# Patient Record
Sex: FEMALE | Race: WHITE | HISPANIC OR LATINO | Employment: STUDENT | URBAN - METROPOLITAN AREA
[De-identification: names, ages, dates, MRNs, and addresses within clinical notes are randomized per-mention and may not be internally consistent; named-entity substitution may affect disease eponyms.]

---

## 2018-09-21 ENCOUNTER — TRANSFERRED RECORDS (OUTPATIENT)
Dept: HEALTH INFORMATION MANAGEMENT | Facility: CLINIC | Age: 28
End: 2018-09-21

## 2019-01-08 ENCOUNTER — OFFICE VISIT (OUTPATIENT)
Dept: FAMILY MEDICINE | Facility: CLINIC | Age: 29
End: 2019-01-08
Payer: COMMERCIAL

## 2019-01-08 VITALS
OXYGEN SATURATION: 100 % | RESPIRATION RATE: 16 BRPM | BODY MASS INDEX: 23.4 KG/M2 | HEART RATE: 80 BPM | DIASTOLIC BLOOD PRESSURE: 72 MMHG | WEIGHT: 140.6 LBS | SYSTOLIC BLOOD PRESSURE: 115 MMHG

## 2019-01-08 DIAGNOSIS — H53.419 VISUAL FIELD SCOTOMA, UNSPECIFIED LATERALITY: Primary | ICD-10-CM

## 2019-01-08 DIAGNOSIS — H53.9 VISUAL DISTURBANCE: ICD-10-CM

## 2019-01-08 LAB
ANION GAP SERPL CALCULATED.3IONS-SCNC: 6 MMOL/L (ref 3–14)
BUN SERPL-MCNC: 8 MG/DL (ref 7–30)
CALCIUM SERPL-MCNC: 8.7 MG/DL (ref 8.5–10.1)
CHLORIDE SERPL-SCNC: 106 MMOL/L (ref 94–109)
CO2 SERPL-SCNC: 26 MMOL/L (ref 20–32)
CREAT SERPL-MCNC: 0.66 MG/DL (ref 0.52–1.04)
GFR SERPL CREATININE-BSD FRML MDRD: >90 ML/MIN/{1.73_M2}
GLUCOSE SERPL-MCNC: 85 MG/DL (ref 70–99)
HGB BLD-MCNC: 13 G/DL (ref 11.7–15.7)
POTASSIUM SERPL-SCNC: 4.2 MMOL/L (ref 3.4–5.3)
SODIUM SERPL-SCNC: 138 MMOL/L (ref 133–144)
TSH SERPL DL<=0.005 MIU/L-ACNC: 1.18 MU/L (ref 0.4–4)

## 2019-01-08 ASSESSMENT — ENCOUNTER SYMPTOMS
NERVOUS/ANXIOUS: 0
LIGHT-HEADEDNESS: 1
FATIGUE: 1
DYSPHORIC MOOD: 0
DECREASED CONCENTRATION: 0
WEAKNESS: 0
PHOTOPHOBIA: 0
ACTIVITY CHANGE: 0
POLYPHAGIA: 0
POLYDIPSIA: 0
EYE ITCHING: 0
NUMBNESS: 0
RESPIRATORY NEGATIVE: 1
APPETITE CHANGE: 0
CARDIOVASCULAR NEGATIVE: 1
DIAPHORESIS: 0
DIZZINESS: 0
SEIZURES: 0
SLEEP DISTURBANCE: 0
EYE PAIN: 0
SINUS PAIN: 0
EYE DISCHARGE: 0
SINUS PRESSURE: 0

## 2019-01-08 NOTE — PROGRESS NOTES
"       HPI       Gail Oneil is a 28 year old  who presents for   Chief Complaint   Patient presents with     Eye Problem     vision disturbances       Visual disturbances x 8 months  - \"trailing blurs\" when she moves her head; sometimes \"sees an outline of the person after they go away\"  - \"flashing\" in upper visual field, typically to the L side, sometimes a \"block\" or \"blur\"  - no associated HA, no other weakness, numbness, tingling  - seems worse when dehydrated  - normal eating pattern, doesn't skip meals, hasn't lost weight  - no GI symptoms or other neuro symptoms    [facility]  - no change in glasses prescription  - told that her retinal exam was normal  - advised to have further eval of symptoms    No h/o migraine  +h/o mild anxiety (that has been fine, though initially a little more stressed at work)  Feels some plugging of her ears on occasion, but then that clears up without incident    Works on the [employer] as an RN, 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        Current Outpatient Medications   Medication Sig Dispense Refill     etonogestrel-ethinyl estradiol (NUVARING) 0.12-0.015 MG/24HR vaginal ring Place 1 each vaginally every 28 days       Fexofenadine HCl (ALLEGRA PO) Take 180 mg by mouth       fluticasone (FLONASE) 50 MCG/ACT nasal spray Spray 2 sprays into both nostrils daily         No Known Allergies.    Patient is a new patient to this clinic and so  I reviewed/updated the Past Medical History, the Family History and the Social History   Past Medical History:   Diagnosis Date     NO ACTIVE PROBLEMS (aka NONE)      Family History   Problem Relation Age of Onset     Glaucoma Maternal Grandfather      Diabetes Paternal " "Grandmother      Ovarian Cancer Paternal Grandmother      Social History     Socioeconomic History     Marital status: Single     Spouse name: None     Number of children: None     Years of education: None     Highest education level: None   Social Needs     Financial resource strain: None     Food insecurity - worry: None     Food insecurity - inability: None     Transportation needs - medical: None     Transportation needs - non-medical: None   Occupational History     None   Tobacco Use     Smoking status: Never Smoker     Smokeless tobacco: Never Used   Substance and Sexual Activity     Alcohol use: Yes     Drug use: No     Sexual activity: Yes     Partners: Male     Birth control/protection: Inserts/Ring   Other Topics Concern     Parent/sibling w/ CABG, MI or angioplasty before 65F 55M? Not Asked   Social History Narrative     None   .         Review of Systems:   Review of Systems   Constitutional: Positive for fatigue (mild). Negative for activity change, appetite change and diaphoresis.   HENT: Negative for congestion, hearing loss, sinus pressure, sinus pain and tinnitus.    Eyes: Positive for visual disturbance (see HPI). Negative for photophobia, pain, discharge and itching.   Respiratory: Negative.    Cardiovascular: Negative.    Endocrine: Negative for polydipsia, polyphagia and polyuria.   Neurological: Positive for light-headedness (on rare occasion feels like she has a \"vasovagal attack\"). Negative for dizziness, seizures, weakness and numbness.   Psychiatric/Behavioral: Negative for decreased concentration, dysphoric mood and sleep disturbance. The patient is not nervous/anxious.             Physical Exam:     Vitals:    01/08/19 1451   BP: 115/72   Pulse: 80   Resp: 16   SpO2: 100%   Weight: 63.8 kg (140 lb 9.6 oz)     Body mass index is 23.4 kg/m .  Vitals were reviewed and were normal     Physical Exam   Constitutional: She is oriented to person, place, and time. She appears well-developed and " well-nourished.   HENT:   Head: Normocephalic and atraumatic.   Eyes: Conjunctivae and EOM are normal. Pupils are equal, round, and reactive to light. Right eye exhibits no discharge. Left eye exhibits no discharge. No scleral icterus.   Pulmonary/Chest: Effort normal.   Musculoskeletal: Normal range of motion.   Neurological: She is alert and oriented to person, place, and time. She displays normal reflexes. No cranial nerve deficit or sensory deficit. She exhibits normal muscle tone. Coordination normal.     MENTAL STATUS EXAM  Appearance: appropriate  Attitude: cooperative  Behavior: normal  Eye Contact: normal  Speech: normal  Orientation: oriented to person, place, time and situation  Mood: normal  Affect: Congruent with mood  Thought Process: appropriate      Results:   Results are ordered and pending    Assessment and Plan        Patient Instructions   Here is the plan from today's visit    1. Visual field scotoma, unspecified laterality  2. Visual disturbance  Reassured re: normal neuro exam, no evidence of weakness or deficit  No headaches  Possibly indicates ocular migraine or aura without headache?  Some basic labs checked today  Recommend maintaining adequate hydration, rest, and po intake (as all of these could make things worse)  Referral for neuro-optho to review case and recommend if any further eval is warranted (or trial of medication)  - Hemoglobin  - Basic Metabolic Panel ([facility])  - TSH with free T4 reflex  - OPHTHALMOLOGY ADULT REFERRAL    Please call or return to clinic if your symptoms don't go away.    Follow up plan - after labs and eye appt           There are no discontinued medications.    Options for treatment and follow-up care were reviewed with the patient. Gail Oneil  engaged in the decision making process and verbalized understanding of the options discussed and agreed with the final plan.    Kanwal Roldan MD

## 2019-01-08 NOTE — PATIENT INSTRUCTIONS
Here is the plan from today's visit    1. Visual field scotoma, unspecified laterality  - Hemoglobin  - Basic Metabolic Panel ([facility])  - TSH with free T4 reflex  - OPHTHALMOLOGY ADULT REFERRAL    2. Visual disturbance  - OPHTHALMOLOGY ADULT REFERRAL    Please call or return to clinic if your symptoms don't go away.    Follow up plan - after labs and eye appt    Thank you for coming to [facility] today.  Lab Testing:  **If you had lab testing today and your results are reassuring or normal they will be mailed to you or sent through [facility] within 7 days.   **If the lab tests need quick action we will call you with the results.  The phone number we will call with results is # [phone] (home) . If this is not the best number please call our clinic and change the number.  Medication Refills:  If you need any refills please call your pharmacy and they will contact us.   If you need to  your refill at a new pharmacy, please contact the new pharmacy directly. The new pharmacy will help you get your medications transferred faster.   Scheduling:  If you have any concerns about today's visit or wish to schedule another appointment please call our office during normal business hours [phone] (8-5:00 M-F)  If a referral was made to a [facility] and you don't get a call from central scheduling please call [phone].  If a Mammogram was ordered for you at [facility] call [phone] to schedule or change your appointment.  If you had an XRay/CT/Ultrasound/MRI ordered the number is [phone] to schedule or change your radiology appointment.   Medical Concerns:  If you have urgent medical concerns please call [phone] at any time of the day.  If you have a medical emergency please call [phone].

## 2019-01-24 NOTE — TELEPHONE ENCOUNTER
FUTURE VISIT INFORMATION      FUTURE VISIT INFORMATION:    Date: 02/20/19    Time: 900am    Location: [facility]  REFERRAL INFORMATION:    Referring provider:  DOROTHY BUCHANAN    Referring providers clinic:  [facility]    Reason for visit/diagnosis  Visual field scotoma, unspecified laterality, Visual disturbance    RECORDS REQUESTED FROM:       Clinic name Comments Records Status Imaging Status   [facility] Notes in Epic per visit on 1/8/19 IN EPIC

## 2019-01-30 ENCOUNTER — PRE VISIT (OUTPATIENT)
Dept: OPHTHALMOLOGY | Facility: CLINIC | Age: 29
End: 2019-01-30

## 2019-01-30 DIAGNOSIS — H53.10 SUBJECTIVE VISUAL DISTURBANCE: Primary | ICD-10-CM

## 2019-02-15 ENCOUNTER — HEALTH MAINTENANCE LETTER (OUTPATIENT)
Age: 29
End: 2019-02-15

## 2019-02-20 ENCOUNTER — OFFICE VISIT (OUTPATIENT)
Dept: OPHTHALMOLOGY | Facility: CLINIC | Age: 29
End: 2019-02-20
Payer: COMMERCIAL

## 2019-02-20 DIAGNOSIS — G43.109 OCULAR MIGRAINE: Primary | ICD-10-CM

## 2019-02-20 DIAGNOSIS — H53.10 SUBJECTIVE VISUAL DISTURBANCE: ICD-10-CM

## 2019-02-20 DIAGNOSIS — H53.419 VISUAL FIELD SCOTOMA, UNSPECIFIED LATERALITY: ICD-10-CM

## 2019-02-20 ASSESSMENT — EXTERNAL EXAM - LEFT EYE: OS_EXAM: NORMAL

## 2019-02-20 ASSESSMENT — TONOMETRY
IOP_METHOD: ICARE
OS_IOP_MMHG: 19
OD_IOP_MMHG: 21

## 2019-02-20 ASSESSMENT — SLIT LAMP EXAM - LIDS
COMMENTS: NORMAL
COMMENTS: NORMAL

## 2019-02-20 ASSESSMENT — CONF VISUAL FIELD
METHOD: COUNTING FINGERS
OD_NORMAL: 1
OS_NORMAL: 1

## 2019-02-20 ASSESSMENT — EXTERNAL EXAM - RIGHT EYE: OD_EXAM: NORMAL

## 2019-02-20 ASSESSMENT — CUP TO DISC RATIO
OD_RATIO: 0.3
OS_RATIO: 0.3

## 2019-02-20 ASSESSMENT — VISUAL ACUITY
OS_CC: 20/20
CORRECTION_TYPE: GLASSES
METHOD: SNELLEN - LINEAR
OD_CC: 20/20

## 2019-02-20 NOTE — LETTER
2019         RE:  :  MRN: Gail Oneil  1990  [id number]     Dear Dr. Roldan,    Thank you for asking me to see your very pleasant patient, Gail Oneil, in neuro-ophthalmic consultation.  I would like to thank you for sending your records and I have summarized them in the history of present illness.  My assessment and plan are below.  For further details, please see my attached clinic note.           Assessment & Plan     Gail Oneil is a 28 year old female with the following diagnoses:   1. Ocular migraine    2. Subjective visual disturbance       Referred by her PCP for visual symptoms.     She notices TV static x 6 months. She notices trailing objects x 6-8 months. She still sees an shadow after she looks at something. She also feels like there's a rectangular/oval wavy water (this happens about once a week, lasts for about 1 hour). No associated headaches. All her visual symptoms worsen when she is more tired. She also feels that dehydration makes it better as well.    No history of migraine headaches. She does not get headaches at all.     Summer of 2018 she did use LSD 6 times. 3-4 years ago, she did use it 4 years. She rarely uses marijuana.     She did have an eye exam  which was normal.     Past med history: none  Past ocular history: none   Meds: nuvaring, allergra, flonase.  Family history: negative for migraine headaches.     On examination, her visual acuity is 20/20 both eyes. There was no afferent pupillary defect. Color plates full both eyes. Anterior segment and funduscopic examination were unremarkable. retinal nerve fiber layer OCT normal. GTOP visual field normal both eyes.     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  Her palinopsias are most likely related to her previous use of LSD.      She also has persistent positive visual phenomena or visual snow. This is an entity where patients see constant or  nearly constant unformed hallucinations.  The most common description that patients give is television snow, however it has been described as red dots, pixelations, and black spots.  Given the normal visual field, no further workup is necessary.  I reassured the patient that this is a common phenomenon and is not vision threatening.  I also told the patient that learning to ignore it is the best strategy and that there is no treatment for it.        She does not endorse any history of migraine headaches or have a family history of migraine headaches. But her rectangular/waviness sound like ocular auras. Her eye examination is normal today.  Reassurance provided.      Again, thank you for allowing me to participate in the care of your patient.      Sincerely,    James Mann MD  Professor  Ophthalmology Residency   Director of Neuro-Ophthalmology  [employer] [facility] [employer] Endowed Chair  Departments of Ophthalmology, Neurology, and Neurosurgery  [facility] [address]  [address] [place] [address]  [id number]  T - [phone]  F - [phone]  [email or web address]    DX = persistent positive visual phenomena of migraine, palinopsia

## 2019-02-20 NOTE — PROGRESS NOTES
Assessment & Plan     Gail Oneil is a 28 year old female with the following diagnoses:   1. Ocular migraine    2. Subjective visual disturbance       Referred by her PCP for visual symptoms.     She notices TV static x 6 months. She notices trailing objects x 6-8 months. She still sees an shadow after she looks at something. She also feels like there's a rectangular/oval wavy water (this happens about once a week, lasts for about 1 hour). No associated headaches. All her visual symptoms worsen when she is more tired. She also feels that dehydration makes it better as well.    No history of migraine headaches. She does not get headaches at all.     Summer of 2018 she did use LSD 6 times. 3-4 years ago, she did use it 4 years. She rarely uses marijuana.     She did have an eye exam fall of 2018 which was normal.     Past med history: none  Past ocular history: none   Meds: nuvaring, allergra, flonase.  Family history: negative for migraine headaches.     On examination, her visual acuity is 20/20 both eyes. There was no afferent pupillary defect. Color plates full both eyes. Anterior segment and funduscopic examination were unremarkable. retinal nerve fiber layer OCT normal. GTOP visual field normal both eyes.     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  Her palinopsias are most likely related to her previous use of LSD.      She also has persistent positive visual phenomena or visual snow. This is an entity where patients see constant or nearly constant unformed hallucinations.  The most common description that patients give is television snow, however it has been described as red dots, pixelations, and black spots.  Given the normal visual field, no further workup is necessary.  I reassured the patient that this is a common phenomenon and is not vision threatening.  I also told the patient that learning to ignore it is the best strategy and that there  is no treatment for it.        She does not endorse any history of migraine headaches or have a family history of migraine headaches. But her rectangular/waviness sound like ocular auras. Her eye examination is normal today.  Reassurance provided.             Attending Physician Attestation:  Complete documentation of historical and exam elements from today's encounter can be found in the full encounter summary report (not reduplicated in this progress note).  I personally obtained the chief complaint(s) and history of present illness.  I confirmed and edited as necessary the review of systems, past medical/surgical history, family history, social history, and examination findings as documented by others; and I examined the patient myself.  I personally reviewed the relevant tests, images, and reports as documented above.  I formulated and edited as necessary the assessment and plan and discussed the findings and management plan with the patient and family. - James Forbes MD  Neuro-ophthalmology Fellow

## 2019-02-20 NOTE — NURSING NOTE
Chief Complaints and History of Present Illnesses   Patient presents with     Visual Field Defect Evaluation     Chief Complaint(s) and History of Present Illness(es)     Visual Field Defect Evaluation     Laterality: both eyes    Quality: fluctuating    Associated symptoms: Negative for headache    Treatments tried: no treatments    Pain scale: 0/10              Comments     Pt states has had some visual snow- started 6 months ago. Pt states it looks really distracting but does not blur her vision. Feels like there is a patch of vision in the left eye the is always wavy. No head aches/ migraines.     Last eye exam 4 months ago.    Ric Vann COT 9:13 AM February 20, 2019

## 2020-03-02 ENCOUNTER — HEALTH MAINTENANCE LETTER (OUTPATIENT)
Age: 30
End: 2020-03-02

## 2020-08-06 ENCOUNTER — RESULTS ONLY (OUTPATIENT)
Dept: LAB | Age: 30
End: 2020-08-06

## 2020-08-06 ENCOUNTER — OFFICE VISIT (OUTPATIENT)
Dept: URGENT CARE | Facility: URGENT CARE | Age: 30
End: 2020-08-06
Payer: COMMERCIAL

## 2020-08-06 DIAGNOSIS — Z53.9 ERRONEOUS ENCOUNTER--DISREGARD: Primary | ICD-10-CM

## 2020-08-07 LAB
SARS-COV-2 RNA SPEC QL NAA+PROBE: NOT DETECTED
SPECIMEN SOURCE: NORMAL

## 2020-12-20 ENCOUNTER — HEALTH MAINTENANCE LETTER (OUTPATIENT)
Age: 30
End: 2020-12-20

## 2021-04-24 ENCOUNTER — HEALTH MAINTENANCE LETTER (OUTPATIENT)
Age: 31
End: 2021-04-24

## 2021-10-03 ENCOUNTER — HEALTH MAINTENANCE LETTER (OUTPATIENT)
Age: 31
End: 2021-10-03

## 2021-12-24 NOTE — PROGRESS NOTES
SUBJECTIVE:   CC: Gail Oneil is an 31 year old woman who presents for preventive health visit.     Patient has been advised of split billing requirements and indicates understanding: Yes  Healthy Habits:     Getting at least 3 servings of Calcium per day:  Yes    Bi-annual eye exam:  Yes    Dental care twice a year:  Yes    Sleep apnea or symptoms of sleep apnea:  None    Diet:  Regular (no restrictions)    Frequency of exercise:  1 day/week    Duration of exercise:  15-30 minutes    Taking medications regularly:  Yes    Medication side effects:  None    PHQ-2 Total Score: 2    Additional concerns today:  No    She has a history significant for depression, anxiety and seasonal allergies.  She feels like these issues are under good control on her current medications.  She reports feeling like she is in good health.    Social history: , no children.  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                Today's PHQ-2 Score:   PHQ-2 ( 1999 [employer]) 12/28/2021   Q1: Little interest or pleasure in doing things 1   Q2: Feeling down, depressed or hopeless 1   PHQ-2 Score 2   PHQ-2 Total Score (12-17 Years)- Positive if 3 or more points; Administer PHQ-A if positive -   Q1: Little interest or pleasure in doing things Several days   Q2: Feeling down, depressed or hopeless Several days   PHQ-2 Score 2       Abuse: Current or Past (Physical, Sexual or Emotional) - No  Do you feel safe in your environment? Yes    Have you ever done Advance Care Planning? (For example, a Health Directive, POLST, or a discussion with a medical provider or your loved ones about your wishes): No, advance care planning information given to patient to review.  Patient declined advance care planning discussion at this time.    Social History     Tobacco Use     Smoking status: Never Smoker     Smokeless tobacco: Never Used   Substance Use Topics     Alcohol use: Yes     If you drink alcohol do you  typically have >3 drinks per day or >7 drinks per week? No    Alcohol Use 12/28/2021   Prescreen: >3 drinks/day or >7 drinks/week? Yes   Prescreen: >3 drinks/day or >7 drinks/week? -   AUDIT SCORE  8       Reviewed orders with patient.  Reviewed health maintenance and updated orders accordingly - Yes  Lab work is in process    Breast Cancer Screening:  Any new diagnosis of family breast, ovarian, or bowel cancer? No    FHS-7:   Breast CA Risk Assessment (FHS-7) 12/28/2021   Did any of your first-degree relatives have breast or ovarian cancer? No   Did any of your relatives have bilateral breast cancer? No   Did any man in your family have breast cancer? No   Did any woman in your family have breast and ovarian cancer? Yes   Did any woman in your family have breast cancer before age 50 y? No   Do you have 2 or more relatives with breast and/or ovarian cancer? No   Do you have 2 or more relatives with breast and/or bowel cancer? No         Pertinent mammograms are reviewed under the imaging tab.       Reviewed and updated as needed this visit by clinical staff  Tobacco  Allergies  Meds   Med Hx  Surg Hx  Fam Hx  Soc Hx       Reviewed and updated as needed this visit by Provider    Meds                Review of Systems   Constitutional: Negative for chills and fever.   HENT: Negative for congestion, ear pain, hearing loss and sore throat.    Eyes: Negative for pain and visual disturbance.   Respiratory: Negative for cough and shortness of breath.    Cardiovascular: Negative for chest pain, palpitations and peripheral edema.   Gastrointestinal: Negative for abdominal pain, constipation, diarrhea, heartburn, hematochezia and nausea.   Genitourinary: Negative for dysuria, frequency, genital sores, hematuria and urgency.   Musculoskeletal: Negative for arthralgias, joint swelling and myalgias.   Skin: Negative for rash.   Neurological: Negative for dizziness, weakness, headaches and paresthesias.    Psychiatric/Behavioral: Negative for mood changes. The patient is nervous/anxious.           OBJECTIVE:   /75   Pulse 59   Temp 98.5  F (36.9  C) (Oral)   Wt 66.6 kg (146 lb 12.8 oz)   SpO2 97%   BMI 24.43 kg/m    Physical Exam  GENERAL: healthy, alert and no distress  EYES: Eyes grossly normal to inspection, PERRL and conjunctivae and sclerae normal  HENT: ear canals and TM's normal, nose and mouth without ulcers or lesions  NECK: no adenopathy, no asymmetry, masses, or scars and thyroid normal to palpation  RESP: lungs clear to auscultation - no rales, rhonchi or wheezes  CV: regular rate and rhythm, normal S1 S2, no S3 or S4, no murmur, click or rub, no peripheral edema and peripheral pulses strong  ABDOMEN: soft, nontender, no hepatosplenomegaly, no masses and bowel sounds normal  MS: no gross musculoskeletal defects noted, no edema  SKIN: no suspicious lesions or rashes  NEURO: Normal strength and tone, mentation intact and speech normal  PSYCH: mentation appears normal, affect normal/bright    Diagnostic Test Results:  Labs reviewed in Epic    ASSESSMENT/PLAN:   1. Routine general medical examination at a health care facility  She appears to be in good health.  We are going to check fasting labs as listed below.    2. Moderate episode of recurrent major depressive disorder (H)  Stable and well-controlled on citalopram.  She may continue to see her psychiatrist.    3. Anxiety  Stable and controlled on citalopram.    4. Seasonal allergic rhinitis, unspecified trigger  Stable and controlled on Flonase and Allegra.    5. Screening cholesterol level  - Lipid Profile (Chol, Trig, HDL, LDL calc); Future  - Lipid Profile (Chol, Trig, HDL, LDL calc)    6. Screening for diabetes mellitus  - Glucose; Future  - Glucose    7. Encounter for hepatitis C screening test for low risk patient  - Hepatitis C antibody; Future  - Hepatitis C antibody      Patient has been advised of split billing requirements and  "indicates understanding: Yes  COUNSELING:  Reviewed preventive health counseling, as reflected in patient instructions       Regular exercise       Healthy diet/nutrition       Consider Hep C screening for all patients one time for ages 18-79 years    Estimated body mass index is 24.43 kg/m  as calculated from the following:    Height as of 12/8/15: 1.651 m (5' 5\").    Weight as of this encounter: 66.6 kg (146 lb 12.8 oz).        She reports that she has never smoked. She has never used smokeless tobacco.      Counseling Resources:  ATP IV Guidelines  Pooled Cohorts Equation Calculator  Breast Cancer Risk Calculator  BRCA-Related Cancer Risk Assessment: FHS-7 Tool  FRAX Risk Assessment  ICSI Preventive Guidelines  Dietary Guidelines for Americans, 2010  USDA's MyPlate  ASA Prophylaxis  Lung CA Screening    Joe Cardoza DO  [facility]  "

## 2021-12-28 ENCOUNTER — OFFICE VISIT (OUTPATIENT)
Dept: FAMILY MEDICINE | Facility: CLINIC | Age: 31
End: 2021-12-28
Payer: COMMERCIAL

## 2021-12-28 VITALS
OXYGEN SATURATION: 97 % | DIASTOLIC BLOOD PRESSURE: 75 MMHG | TEMPERATURE: 98.5 F | HEART RATE: 59 BPM | SYSTOLIC BLOOD PRESSURE: 105 MMHG | WEIGHT: 146.8 LBS | BODY MASS INDEX: 24.43 KG/M2

## 2021-12-28 DIAGNOSIS — F33.1 MODERATE EPISODE OF RECURRENT MAJOR DEPRESSIVE DISORDER (H): ICD-10-CM

## 2021-12-28 DIAGNOSIS — J30.2 SEASONAL ALLERGIC RHINITIS, UNSPECIFIED TRIGGER: ICD-10-CM

## 2021-12-28 DIAGNOSIS — F41.9 ANXIETY: ICD-10-CM

## 2021-12-28 DIAGNOSIS — Z11.59 ENCOUNTER FOR HEPATITIS C SCREENING TEST FOR LOW RISK PATIENT: ICD-10-CM

## 2021-12-28 DIAGNOSIS — Z13.1 SCREENING FOR DIABETES MELLITUS: ICD-10-CM

## 2021-12-28 DIAGNOSIS — Z00.00 ROUTINE GENERAL MEDICAL EXAMINATION AT A HEALTH CARE FACILITY: Primary | ICD-10-CM

## 2021-12-28 DIAGNOSIS — Z13.220 SCREENING CHOLESTEROL LEVEL: ICD-10-CM

## 2021-12-28 LAB
CHOLEST SERPL-MCNC: 186 MG/DL
FASTING STATUS PATIENT QL REPORTED: YES
FASTING STATUS PATIENT QL REPORTED: YES
GLUCOSE BLD-MCNC: 89 MG/DL (ref 70–99)
HCV AB SERPL QL IA: NONREACTIVE
HDLC SERPL-MCNC: 112 MG/DL
LDLC SERPL CALC-MCNC: 65 MG/DL
NONHDLC SERPL-MCNC: 74 MG/DL
TRIGL SERPL-MCNC: 47 MG/DL

## 2021-12-28 PROCEDURE — 36415 COLL VENOUS BLD VENIPUNCTURE: CPT | Performed by: FAMILY MEDICINE

## 2021-12-28 PROCEDURE — 82947 ASSAY GLUCOSE BLOOD QUANT: CPT | Performed by: FAMILY MEDICINE

## 2021-12-28 PROCEDURE — 99385 PREV VISIT NEW AGE 18-39: CPT | Performed by: FAMILY MEDICINE

## 2021-12-28 PROCEDURE — 80061 LIPID PANEL: CPT | Performed by: FAMILY MEDICINE

## 2021-12-28 PROCEDURE — 86803 HEPATITIS C AB TEST: CPT | Performed by: FAMILY MEDICINE

## 2021-12-28 RX ORDER — CITALOPRAM HYDROBROMIDE 20 MG/1
40 TABLET ORAL DAILY
COMMUNITY
Start: 2021-11-27 | End: 2023-06-09

## 2021-12-28 ASSESSMENT — ENCOUNTER SYMPTOMS
WEAKNESS: 0
CONSTIPATION: 0
MYALGIAS: 0
DIZZINESS: 0
ARTHRALGIAS: 0
JOINT SWELLING: 0
SORE THROAT: 0
NERVOUS/ANXIOUS: 1
PALPITATIONS: 0
ABDOMINAL PAIN: 0
DIARRHEA: 0
EYE PAIN: 0
FEVER: 0
DYSURIA: 0
HEARTBURN: 0
HEADACHES: 0
CHILLS: 0
HEMATURIA: 0
SHORTNESS OF BREATH: 0
HEMATOCHEZIA: 0
PARESTHESIAS: 0
FREQUENCY: 0
NAUSEA: 0
COUGH: 0

## 2021-12-28 ASSESSMENT — PATIENT HEALTH QUESTIONNAIRE - PHQ9: SUM OF ALL RESPONSES TO PHQ QUESTIONS 1-9: 3

## 2022-09-10 ENCOUNTER — HEALTH MAINTENANCE LETTER (OUTPATIENT)
Age: 32
End: 2022-09-10

## 2022-10-04 ENCOUNTER — HOSPITAL ENCOUNTER (EMERGENCY)
Facility: CLINIC | Age: 32
Discharge: HOME | End: 2022-10-04
Payer: COMMERCIAL

## 2022-10-04 ENCOUNTER — HOSPITAL ENCOUNTER (EMERGENCY)
Facility: CLINIC | Age: 32
Discharge: HOME | End: 2022-10-05
Attending: EMERGENCY MEDICINE | Admitting: EMERGENCY MEDICINE
Payer: COMMERCIAL

## 2022-10-04 ENCOUNTER — APPOINTMENT (OUTPATIENT)
Dept: RADIOLOGY | Facility: CLINIC | Age: 32
End: 2022-10-04
Attending: FAMILY MEDICINE
Payer: COMMERCIAL

## 2022-10-04 DIAGNOSIS — S82.091A OTHER CLOSED FRACTURE OF RIGHT PATELLA, INITIAL ENCOUNTER: ICD-10-CM

## 2022-10-04 PROCEDURE — 73562 X-RAY EXAM OF KNEE 3: CPT | Mod: RT

## 2022-10-04 PROCEDURE — 99284 EMERGENCY DEPT VISIT MOD MDM: CPT

## 2022-10-04 RX ORDER — OXYCODONE HYDROCHLORIDE 5 MG/1
5 TABLET ORAL EVERY 6 HOURS PRN
Qty: 12 TABLET | Refills: 0 | Status: SHIPPED | OUTPATIENT
Start: 2022-10-04 | End: 2022-10-07

## 2022-10-04 ASSESSMENT — ACTIVITIES OF DAILY LIVING (ADL): ADLS_ACUITY_SCORE: 35

## 2022-10-05 VITALS — HEART RATE: 63 BPM | DIASTOLIC BLOOD PRESSURE: 71 MMHG | RESPIRATION RATE: 18 BRPM | SYSTOLIC BLOOD PRESSURE: 112 MMHG

## 2022-10-05 NOTE — ED PROVIDER NOTES
EMERGENCY DEPARTMENT ENCOUNTER      NAME: Gail Oneil  AGE: 31 year old female  YOB: 1990  MRN: [id number]  EVALUATION DATE & TIME: 10/4/2022  9:30 PM    PCP: No Ref-Primary, Physician    ED PROVIDER: Jarad Sherwood M.D.      Chief Complaint   Patient presents with     Knee Injury         FINAL IMPRESSION:  1. Other closed fracture of right patella, initial encounter          ED COURSE & MEDICAL DECISION MAKING:    Pertinent Labs & Imaging studies reviewed. (See chart for details)  31 year old female presents to the Emergency Department for evaluation of knee pain. Patient appears non toxic with stable vitals signs, patient is afebrile with no tachycardia or hypoxia. Overall exam is benign.  Patient is neurovascular intact good distal sensation and pulses.  No signs of significant ligamentous laxity.  Nothing to suggest neurovascular compromise, patient denies any deformity.  Considered but low suspicion for fracture dislocation we will obtain plain films.  Patient was offered but she deferred pain medications.    Reassessment: Plain films reported avulsion fracture from the medial patella.  Did discuss these findings with [facility] who at this time recommends knee immobilizer, crutches and close follow-up.  Discussed these findings and recommendations with the patient and repeat exam is benign.  Again nothing to suggest vascular compromise based on exam.  Patient will be placed in knee immobilizer and given crutches, will discharge with short course of oxycodone as needed for severe pain and otherwise recommend conservative management with rest, Tylenol ibuprofen and close follow-up with [facility] in the next 2 to 3 days.  All questions were answered and reasons to return discussed.  Patient felt comfortable this plan was discharged in stable condition.    9:49 PM I met with patient for initial encounter.  11:41 PM I spoke to Dr Tavares with [facility] regarding  patient.  11:45 PM Repeat exam is benign, discussed findings and discharge, close follow up.    At the conclusion of the encounter I discussed the results of all of the tests and the disposition. The questions were answered and return precautions provided. The patient or family acknowledged understanding and was agreeable with the care plan.         MEDICATIONS GIVEN IN THE EMERGENCY:  Medications - No data to display    NEW PRESCRIPTIONS STARTED AT TODAY'S ER VISIT  Discharge Medication List as of 10/4/2022 11:59 PM      START taking these medications    Details   oxyCODONE (ROXICODONE) 5 MG tablet Take 1 tablet (5 mg) by mouth every 6 hours as needed for pain, Disp-12 tablet, R-0, Local Print                  =================================================================    HPI    Patient information was obtained from: Patient    Use of Intrepreter: N/A        Gail Oneil is a 31 year old female who presents via walk-in for evaluation of a right knee injury.    Patient states she was skateboarding with her  around 6:15 PM when she fell backwards and her  landed on her right knee, no LOC, no head trauma. She is not on blood thinners. Patient now endorses ongoing sharp right knee pain which is worse with extension. She has been taking ibuprofen x2/day since she ran a marathon 2 days ago, last ibuprofen around 1 PM.     Patient denies headache, neck pain, nausea, vomiting and all other relevant symptoms.      REVIEW OF SYSTEMS   Constitutional:  Denies fever, chills  Respiratory:  Denies productive cough or increased work of breathing  Cardiovascular:  Denies chest pain, palpitations  GI:  Denies abdominal pain, nausea, vomiting, or change in bowel or bladder habits   Musculoskeletal:  Positive for right knee pain.  Skin:  Denies rash   Neurologic:  Denies focal weakness  All systems negative except as marked.     PAST MEDICAL HISTORY:  Past Medical History:   Diagnosis Date     NO ACTIVE PROBLEMS  (aka NONE)        PAST SURGICAL HISTORY:  Past Surgical History:   Procedure Laterality Date     WISDOM TOOTH EXTRACTION           CURRENT MEDICATIONS:    Prior to Admission medications    Medication Sig Start Date End Date Taking? Authorizing Provider   citalopram (CELEXA) 20 MG tablet TAKE 1 TABLET BY MOUTH EVERY DAY AS DIRECTED 11/27/21   Reported, Patient   etonogestrel-ethinyl estradiol (NUVARING) 0.12-0.015 MG/24HR vaginal ring Place 1 each vaginally every 28 days    Reported, Patient   Fexofenadine HCl (ALLEGRA PO) Take 180 mg by mouth    Reported, Patient   fluticasone (FLONASE) 50 MCG/ACT nasal spray Spray 2 sprays into both nostrils daily    Reported, Patient        ALLERGIES:  No Known Allergies    FAMILY HISTORY:  Family History   Problem Relation Age of Onset     Glaucoma Maternal Grandfather      Alcoholism Maternal Grandfather      Diabetes Maternal Grandfather      Diabetes Paternal Grandmother      Ovarian Cancer Paternal Grandmother 63     Depression Father      Colon Polyps Father      Diabetes Cousin        SOCIAL HISTORY:   Social History     Socioeconomic History     Marital status:      Spouse name: None     Number of children: None     Years of education: None     Highest education level: None   Tobacco Use     Smoking status: Never Smoker     Smokeless tobacco: Never Used   Substance and Sexual Activity     Alcohol use: Yes     Drug use: No     Sexual activity: Yes     Partners: Male     Birth control/protection: Inserts/Ring       VITALS:  Patient Vitals for the past 24 hrs:   BP Pulse Resp   10/05/22 0010 112/71 63 18        PHYSICAL EXAM    Constitutional:  Awake, alert, in no apparent distress  HENT:  Normocephalic, Atraumatic. Bilateral external ears normal. Oropharynx moist. Nose normal. Neck- Normal range of motion with no guarding, No midline cervical tenderness, Supple, No stridor.   Eyes:  PERRL, EOMI with no signs of entrapment, Conjunctiva normal, No discharge.    Respiratory:  Normal breath sounds, No respiratory distress, No wheezing.    Cardiovascular:  Normal heart rate, Normal rhythm, No appreciable rubs or gallops.   GI:  Soft, No tenderness, No distension, No palpable masses  Musculoskeletal:  Intact distal pulses, No edema. Good range of motion in all major joints. No tenderness to palpation or major deformities noted.  Integument:  Warm, Dry, No erythema, No rash.   Neurologic:  Alert & oriented, Normal motor function, Normal sensory function, No focal deficits noted.   Psychiatric:  Affect normal, Judgment normal, Mood normal.     LAB:  All pertinent labs reviewed and interpreted.  Results for orders placed or performed during the hospital encounter of 10/04/22   XR Knee Right 3 Views    Impression    IMPRESSION: Avulsion fracture from the medial patella. No other fracture or dislocation. Small joint effusion.       RADIOLOGY:  XR Knee Right 3 Views   Final Result   IMPRESSION: Avulsion fracture from the medial patella. No other fracture or dislocation. Small joint effusion.             EKG:      I have independently reviewed and interpreted the EKG(s) documented above.    PROCEDURES:           I, Eleazar Jimenez, am serving as a scribe to document services personally performed by Jarad Sherwood MD, based on my observation and the provider's statements to me. I, Jarad Sherwood MD attest that Eleazar Jimenez is acting in a scribe capacity, has observed my performance of the services and has documented them in accordance with my direction.    Jarad Sherwood M.D.  Emergency Medicine  [facility] EMERGENCY ROOM  [address] [facility] [address]  [phone]  Dept: [phone]      Jarad Sherwood MD  10/05/22 0133

## 2022-10-05 NOTE — ED TRIAGE NOTES
Pt was attempting to ride a skateboard with her  when they fell. Pt thinks her  landed on her right knee. She has swelling and pain to her right knee with movement.     Triage Assessment     Row Name 10/04/22 2029       Triage Assessment (Adult)    Airway WDL WDL       Respiratory WDL    Respiratory WDL WDL       Skin Circulation/Temperature WDL    Skin Circulation/Temperature WDL WDL       Cardiac WDL    Cardiac WDL WDL       Peripheral/Neurovascular WDL    Peripheral Neurovascular WDL WDL       Cognitive/Neuro/Behavioral WDL    Cognitive/Neuro/Behavioral WDL WDL

## 2023-02-21 ENCOUNTER — OFFICE VISIT (OUTPATIENT)
Dept: FAMILY MEDICINE | Facility: CLINIC | Age: 33
End: 2023-02-21
Payer: COMMERCIAL

## 2023-02-21 VITALS
OXYGEN SATURATION: 96 % | HEIGHT: 65 IN | BODY MASS INDEX: 29.51 KG/M2 | WEIGHT: 177.1 LBS | TEMPERATURE: 98.1 F | SYSTOLIC BLOOD PRESSURE: 120 MMHG | DIASTOLIC BLOOD PRESSURE: 75 MMHG | RESPIRATION RATE: 12 BRPM | HEART RATE: 77 BPM

## 2023-02-21 DIAGNOSIS — M25.361 PATELLAR INSTABILITY OF RIGHT KNEE: ICD-10-CM

## 2023-02-21 DIAGNOSIS — F33.1 MODERATE EPISODE OF RECURRENT MAJOR DEPRESSIVE DISORDER (H): ICD-10-CM

## 2023-02-21 DIAGNOSIS — F41.9 ANXIETY: ICD-10-CM

## 2023-02-21 DIAGNOSIS — Z01.818 PREOP GENERAL PHYSICAL EXAM: Primary | ICD-10-CM

## 2023-02-21 LAB
ERYTHROCYTE [DISTWIDTH] IN BLOOD BY AUTOMATED COUNT: 13.1 % (ref 10–15)
HCT VFR BLD AUTO: 39.8 % (ref 35–47)
HGB BLD-MCNC: 13.5 G/DL (ref 11.7–15.7)
MCH RBC QN AUTO: 31.2 PG (ref 26.5–33)
MCHC RBC AUTO-ENTMCNC: 33.9 G/DL (ref 31.5–36.5)
MCV RBC AUTO: 92 FL (ref 78–100)
PLATELET # BLD AUTO: 323 10E3/UL (ref 150–450)
RBC # BLD AUTO: 4.33 10E6/UL (ref 3.8–5.2)
WBC # BLD AUTO: 6.7 10E3/UL (ref 4–11)

## 2023-02-21 PROCEDURE — 99214 OFFICE O/P EST MOD 30 MIN: CPT | Performed by: FAMILY MEDICINE

## 2023-02-21 PROCEDURE — 96127 BRIEF EMOTIONAL/BEHAV ASSMT: CPT | Performed by: FAMILY MEDICINE

## 2023-02-21 PROCEDURE — 85027 COMPLETE CBC AUTOMATED: CPT | Performed by: FAMILY MEDICINE

## 2023-02-21 PROCEDURE — 36415 COLL VENOUS BLD VENIPUNCTURE: CPT | Performed by: FAMILY MEDICINE

## 2023-02-21 ASSESSMENT — PATIENT HEALTH QUESTIONNAIRE - PHQ9
SUM OF ALL RESPONSES TO PHQ QUESTIONS 1-9: 13
SUM OF ALL RESPONSES TO PHQ QUESTIONS 1-9: 13
10. IF YOU CHECKED OFF ANY PROBLEMS, HOW DIFFICULT HAVE THESE PROBLEMS MADE IT FOR YOU TO DO YOUR WORK, TAKE CARE OF THINGS AT HOME, OR GET ALONG WITH OTHER PEOPLE: SOMEWHAT DIFFICULT

## 2023-02-21 ASSESSMENT — ANXIETY QUESTIONNAIRES
7. FEELING AFRAID AS IF SOMETHING AWFUL MIGHT HAPPEN: SEVERAL DAYS
8. IF YOU CHECKED OFF ANY PROBLEMS, HOW DIFFICULT HAVE THESE MADE IT FOR YOU TO DO YOUR WORK, TAKE CARE OF THINGS AT HOME, OR GET ALONG WITH OTHER PEOPLE?: SOMEWHAT DIFFICULT
IF YOU CHECKED OFF ANY PROBLEMS ON THIS QUESTIONNAIRE, HOW DIFFICULT HAVE THESE PROBLEMS MADE IT FOR YOU TO DO YOUR WORK, TAKE CARE OF THINGS AT HOME, OR GET ALONG WITH OTHER PEOPLE: SOMEWHAT DIFFICULT
4. TROUBLE RELAXING: NOT AT ALL
3. WORRYING TOO MUCH ABOUT DIFFERENT THINGS: MORE THAN HALF THE DAYS
GAD7 TOTAL SCORE: 8
2. NOT BEING ABLE TO STOP OR CONTROL WORRYING: SEVERAL DAYS
1. FEELING NERVOUS, ANXIOUS, OR ON EDGE: SEVERAL DAYS
7. FEELING AFRAID AS IF SOMETHING AWFUL MIGHT HAPPEN: SEVERAL DAYS
5. BEING SO RESTLESS THAT IT IS HARD TO SIT STILL: NOT AT ALL
6. BECOMING EASILY ANNOYED OR IRRITABLE: NEARLY EVERY DAY

## 2023-02-21 ASSESSMENT — PAIN SCALES - GENERAL: PAINLEVEL: NO PAIN (0)

## 2023-02-21 NOTE — PROGRESS NOTES
[facility]  [address], [address]  [facility] [address]  Phone: [phone]  Primary Provider: No Ref-Primary, Physician  Pre-op Performing Provider: MARCELO RAMOS      PREOPERATIVE EVALUATION:  Today's date: 2/21/2023    Gail Oneil is a 32 year old female who presents for a preoperative evaluation.    Surgical Information:  Surgery/Procedure: right knee tibial tubercle, MPFL reconstruction  Surgery Location: [facility]  Surgeon: Dr. Osorio  Surgery Date: 03/01/2023  Time of Surgery: TBD  Where patient plans to recover: At home with family  Fax number for surgical facility: [phone]    Type of Anesthesia Anticipated: to be determined    Assessment & Plan     The proposed surgical procedure is considered INTERMEDIATE risk.    Preop general physical exam  Patellar instability of right knee  Under care of Dr. Osorio/Ortho.    Moderate episode of recurrent major depressive disorder (H)  Under care of psychiatrist and therapist.  - EMOTIONAL / BEHAVIORAL ASSESSMENT    Anxiety  - EMOTIONAL / BEHAVIORAL ASSESSMENT  Advised to follow-up closely with her psychiatrist.  She denies any plan of hurting herself or others.  Please see patient instructions     Risks and Recommendations:  The patient has the following additional risks and recommendations for perioperative complications:   - No identified additional risk factors other than previously addressed    Medication Instructions:   - ibuprofen (Advil, Motrin): HOLD 1 day before surgery.     RECOMMENDATION:  APPROVAL GIVEN to proceed with proposed procedure, without further diagnostic evaluation.    Ordering of each unique test  I spent a total of 30 minutes on the day of the visit.   Time spent doing chart review, history and exam, documentation and further activities per the note      Subjective     HPI related to upcoming procedure: I did call orthopedic clinic Dr. Schultz.  Basically she has been  diagnosed with right patella instability.  She has had history of repeated dislocation.  Most recently she ran a mile down on October 2 and had a skateboard injury October 4.      She is completed multiple session of physical therapy and due to ongoing instability knee arthroscopy with  MPFL reconstruction is advised as per orthopedic.  She would like to get back to routine activities and is looking forward to the procedure.    Long standing history of depression  Has psychiatrist and therapist.  she denies suicidal plans &  Has some thoughts & reassiring that she will not hurt herself  Lives with her  reports no side effects from medications. Would like to continue.          Preop Questions 2/21/2023   1. Have you ever had a heart attack or stroke? No   2. Have you ever had surgery on your heart or blood vessels, such as a stent placement, a coronary artery bypass, or surgery on an artery in your head, neck, heart, or legs? No   3. Do you have chest pain with activity? No   4. Do you have a history of  heart failure? No   5. Do you currently have a cold, bronchitis or symptoms of other infection? No   6. Do you have a cough, shortness of breath, or wheezing? No   7. Do you or anyone in your family have previous history of blood clots? No   8. Do you or does anyone in your family have a serious bleeding problem such as prolonged bleeding following surgeries or cuts? No   9. Have you ever had problems with anemia or been told to take iron pills? No   10. Have you had any abnormal blood loss such as black, tarry or bloody stools, or abnormal vaginal bleeding? No   11. Have you ever had a blood transfusion? No   12. Are you willing to have a blood transfusion if it is medically needed before, during, or after your surgery? Yes   13. Have you or any of your relatives ever had problems with anesthesia? No   14. Do you have sleep apnea, excessive snoring or daytime drowsiness? YES - daytime drowsiness due to  depression and insomnia- excessive fatigue.   14a. Do you have a CPAP machine? No   15. Do you have any artifical heart valves or other implanted medical devices like a pacemaker, defibrillator, or continuous glucose monitor? No   16. Do you have artificial joints? No   17. Are you allergic to latex? No   18. Is there any chance that you may be pregnant? No     Health Care Directive:  Patient does not have a Health Care Directive or Living Will: Discussed advance care planning with patient; information given to patient to review.    Preoperative Review of :   reviewed - controlled substances reflected in medication list.          Review of Systems  Constitutional, neuro, ENT, endocrine, pulmonary, cardiac, gastrointestinal, genitourinary, musculoskeletal, integument and psychiatric systems are negative, except as otherwise noted.    Patient Active Problem List    Diagnosis Date Noted     Moderate episode of recurrent major depressive disorder (H) 02/21/2023     Priority: Medium     Under care of psychiatrist and therapist.       Anxiety 02/21/2023     Priority: Medium     Under care of psychiatrist therapist       Visual disturbance 01/08/2019     Priority: Medium     Visual field scotoma, unspecified laterality 01/08/2019     Priority: Medium      Past Medical History:   Diagnosis Date     NO ACTIVE PROBLEMS (aka NONE)      Past Surgical History:   Procedure Laterality Date     WISDOM TOOTH EXTRACTION       Current Outpatient Medications   Medication Sig Dispense Refill     citalopram (CELEXA) 20 MG tablet TAKE 1 TABLET BY MOUTH EVERY DAY AS DIRECTED       Fexofenadine HCl (ALLEGRA PO) Take 180 mg by mouth       fluticasone (FLONASE) 50 MCG/ACT nasal spray Spray 2 sprays into both nostrils daily         No Known Allergies     Social History     Tobacco Use     Smoking status: Never     Smokeless tobacco: Never   Substance Use Topics     Alcohol use: Yes     Family History   Problem Relation Age of Onset      "Glaucoma Maternal Grandfather      Alcoholism Maternal Grandfather      Diabetes Maternal Grandfather      Diabetes Paternal Grandmother      Ovarian Cancer Paternal Grandmother 63     Depression Father      Colon Polyps Father      Diabetes Cousin      History   Drug Use No         Objective     /75 (BP Location: Right arm, Patient Position: Sitting, Cuff Size: Adult Regular)   Pulse 77   Temp 98.1  F (36.7  C) (Temporal)   Resp 12   Ht 1.647 m (5' 4.86\")   Wt 80.3 kg (177 lb 1.6 oz)   LMP 02/03/2023 (Exact Date)   SpO2 96%   BMI 29.60 kg/m      Physical Exam    GENERAL APPEARANCE: healthy, alert and no distress     EYES: EOMI, PERRL     HENT: ear canals and TM's normal and nose and mouth without ulcers or lesions     NECK: no adenopathy, no asymmetry, masses, or scars and thyroid normal to palpation     RESP: lungs clear to auscultation - no rales, rhonchi or wheezes     CV: regular rates and rhythm, normal S1 S2, no S3 or S4 and no murmur, click or rub     ABDOMEN:  soft, nontender, no HSM or masses and bowel sounds normal     MS: extremities normal- no gross deformities noted, no evidence of inflammation in joints, FROM in all extremities.     SKIN: no suspicious lesions or rashes     NEURO: Normal strength and tone, sensory exam grossly normal, mentation intact and speech normal     PSYCH: mentation appears normal. and affect normal/bright       PHQ 12/28/2021 2/21/2023   PHQ-9 Total Score 3 13   Q9: Thoughts of better off dead/self-harm past 2 weeks Not at all Several days   F/U: Thoughts of suicide or self-harm - Yes   F/U: Self harm-plan - No   F/U: Self-harm action - No   F/U: Safety concerns - No     SANTOS-7 SCORE 2/21/2023   Total Score 8 (mild anxiety)   Total Score 8         Diagnostics:  Labs pending at this time.  Results will be reviewed when available.   No EKG required, no history of coronary heart disease, significant arrhythmia, peripheral arterial disease or other structural heart " disease.    Revised Cardiac Risk Index (RCRI):  The patient has the following serious cardiovascular risks for perioperative complications:   - No serious cardiac risks = 0 points     RCRI Interpretation: 0 points: Class I (very low risk - 0.4% complication rate)           Signed Electronically by: Xiomara Tirado MD  Copy of this evaluation report is provided to requesting physician.      Answers for HPI/ROS submitted by the patient on 2/21/2023  If you checked off any problems, how difficult have these problems made it for you to do your work, take care of things at home, or get along with other people?: Somewhat difficult  PHQ9 TOTAL SCORE: 13  SANTOS 7 TOTAL SCORE: 8

## 2023-02-21 NOTE — PATIENT INSTRUCTIONS
Safety plan was reviewed; to the ER as needed or call after hours crisis line; [phone]  Sucide prevention life line [phone]-shpx  Community out reach for psych emergencies [phone].      For informational purposes only. Not to replace the advice of your health care provider. Copyright   ,  [employer] [facility]. All rights reserved. Clinically reviewed by Maribel Moran MD. [employer] [phone] - REV .  Preparing for Your Surgery  Getting started  A nurse will call you to review your health history and instructions. They will give you an arrival time based on your scheduled surgery time. Please be ready to share:  Your doctor's clinic name and phone number  Your medical, surgical, and anesthesia history  A list of allergies and sensitivities  A list of medicines, including herbal treatments and over-the-counter drugs  Whether the patient has a legal guardian (ask how to send us the papers in advance)  Please tell us if you're pregnant--or if there's any chance you might be pregnant. Some surgeries may injure a fetus (unborn baby), so they require a pregnancy test. Surgeries that are safe for a fetus don't always need a test, and you can choose whether to have one.   If you have a child who's having surgery, please ask for a copy of Preparing for Your Child's Surgery.    Preparing for surgery  Within 10 to 30 days of surgery: Have a pre-op exam (sometimes called an H&P, or History and Physical). This can be done at a clinic or pre-operative center.  If you're having a , you may not need this exam. Talk to your care team.  At your pre-op exam, talk to your care team about all medicines you take. If you need to stop any medicines before surgery, ask when to start taking them again.  We do this for your safety. Many medicines can make you bleed too much during surgery. Some change how well surgery (anesthesia) drugs work.  Call your insurance company to let them know you're having surgery. (If  you don't have insurance, call [phone].)  Call your clinic if there's any change in your health. This includes signs of a cold or flu (sore throat, runny nose, cough, rash, fever). It also includes a scrape or scratch near the surgery site.  If you have questions on the day of surgery, call your hospital or surgery center.  Eating and drinking guidelines  For your safety: Unless your surgeon tells you otherwise, follow the guidelines below.  Eat and drink as usual until 8 hours before you arrive for surgery. After that, no food or milk.  Drink clear liquids until 2 hours before you arrive. These are liquids you can see through, like water, Gatorade, and Propel Water. They also include plain black coffee and tea (no cream or milk), candy, and breath mints. You can spit out gum when you arrive.  If you drink alcohol: Stop drinking it the night before surgery.  If your care team tells you to take medicine on the morning of surgery, it's okay to take it with a sip of water.  Preventing infection  Shower or bathe the night before and morning of your surgery. Follow the instructions your clinic gave you. (If no instructions, use regular soap.)  Don't shave or clip hair near your surgery site. We'll remove the hair if needed.  Don't smoke or vape the morning of surgery. You may chew nicotine gum up to 2 hours before surgery. A nicotine patch is okay.  Note: Some surgeries require you to completely quit smoking and nicotine. Check with your surgeon.  Your care team will make every effort to keep you safe from infection. We will:  Clean our hands often with soap and water (or an alcohol-based hand rub).  Clean the skin at your surgery site with a special soap that kills germs.  Give you a special gown to keep you warm. (Cold raises the risk of infection.)  Wear special hair covers, masks, gowns and gloves during surgery.  Give antibiotic medicine, if prescribed. Not all surgeries need antibiotics.  What to bring on the  day of surgery  Photo ID and insurance card  Copy of your health care directive, if you have one  Glasses and hearing aids (bring cases)  You can't wear contacts during surgery  Inhaler and eye drops, if you use them (tell us about these when you arrive)  CPAP machine or breathing device, if you use them  A few personal items, if spending the night  If you have . . .  A pacemaker, ICD (cardiac defibrillator) or other implant: Bring the ID card.  An implanted stimulator: Bring the remote control.  A legal guardian: Bring a copy of the certified (court-stamped) guardianship papers.  Please remove any jewelry, including body piercings. Leave jewelry and other valuables at home.  If you're going home the day of surgery  You must have a responsible adult drive you home. They should stay with you overnight as well.  If you don't have someone to stay with you, and you aren't safe to go home alone, we may keep you overnight. Insurance often won't pay for this.  After surgery  If it's hard to control your pain or you need more pain medicine, please call your surgeon's office.  Questions?   If you have any questions for your care team, list them here: _________________________________________________________________________________________________________________________________________________________________________ ____________________________________ ____________________________________ ____________________________________

## 2023-04-30 ENCOUNTER — HEALTH MAINTENANCE LETTER (OUTPATIENT)
Age: 33
End: 2023-04-30

## 2023-05-17 ENCOUNTER — TRANSFERRED RECORDS (OUTPATIENT)
Dept: MULTI SPECIALTY CLINIC | Facility: CLINIC | Age: 33
End: 2023-05-17

## 2023-05-17 LAB — PAP SMEAR - HIM PATIENT REPORTED: NORMAL

## 2023-06-08 ASSESSMENT — ANXIETY QUESTIONNAIRES
GAD7 TOTAL SCORE: 10
3. WORRYING TOO MUCH ABOUT DIFFERENT THINGS: MORE THAN HALF THE DAYS
4. TROUBLE RELAXING: MORE THAN HALF THE DAYS
1. FEELING NERVOUS, ANXIOUS, OR ON EDGE: NEARLY EVERY DAY
6. BECOMING EASILY ANNOYED OR IRRITABLE: SEVERAL DAYS
5. BEING SO RESTLESS THAT IT IS HARD TO SIT STILL: NOT AT ALL
2. NOT BEING ABLE TO STOP OR CONTROL WORRYING: SEVERAL DAYS
IF YOU CHECKED OFF ANY PROBLEMS ON THIS QUESTIONNAIRE, HOW DIFFICULT HAVE THESE PROBLEMS MADE IT FOR YOU TO DO YOUR WORK, TAKE CARE OF THINGS AT HOME, OR GET ALONG WITH OTHER PEOPLE: VERY DIFFICULT
GAD7 TOTAL SCORE: 10
GAD7 TOTAL SCORE: 10
7. FEELING AFRAID AS IF SOMETHING AWFUL MIGHT HAPPEN: SEVERAL DAYS
7. FEELING AFRAID AS IF SOMETHING AWFUL MIGHT HAPPEN: SEVERAL DAYS
8. IF YOU CHECKED OFF ANY PROBLEMS, HOW DIFFICULT HAVE THESE MADE IT FOR YOU TO DO YOUR WORK, TAKE CARE OF THINGS AT HOME, OR GET ALONG WITH OTHER PEOPLE?: VERY DIFFICULT

## 2023-06-08 ASSESSMENT — PATIENT HEALTH QUESTIONNAIRE - PHQ9
SUM OF ALL RESPONSES TO PHQ QUESTIONS 1-9: 18
10. IF YOU CHECKED OFF ANY PROBLEMS, HOW DIFFICULT HAVE THESE PROBLEMS MADE IT FOR YOU TO DO YOUR WORK, TAKE CARE OF THINGS AT HOME, OR GET ALONG WITH OTHER PEOPLE: VERY DIFFICULT
SUM OF ALL RESPONSES TO PHQ QUESTIONS 1-9: 18

## 2023-06-09 ENCOUNTER — OFFICE VISIT (OUTPATIENT)
Dept: INTERNAL MEDICINE | Facility: CLINIC | Age: 33
End: 2023-06-09
Payer: COMMERCIAL

## 2023-06-09 VITALS
HEART RATE: 75 BPM | DIASTOLIC BLOOD PRESSURE: 70 MMHG | TEMPERATURE: 97.5 F | RESPIRATION RATE: 16 BRPM | HEIGHT: 65 IN | SYSTOLIC BLOOD PRESSURE: 100 MMHG | WEIGHT: 175 LBS | OXYGEN SATURATION: 100 % | BODY MASS INDEX: 29.16 KG/M2

## 2023-06-09 DIAGNOSIS — Z13.220 SCREENING FOR HYPERLIPIDEMIA: ICD-10-CM

## 2023-06-09 DIAGNOSIS — Z13.0 SCREENING FOR IRON DEFICIENCY ANEMIA: ICD-10-CM

## 2023-06-09 DIAGNOSIS — F33.1 MODERATE EPISODE OF RECURRENT MAJOR DEPRESSIVE DISORDER (H): Primary | ICD-10-CM

## 2023-06-09 DIAGNOSIS — Z13.1 SCREENING FOR DIABETES MELLITUS: ICD-10-CM

## 2023-06-09 LAB
ANION GAP SERPL CALCULATED.3IONS-SCNC: 11 MMOL/L (ref 7–15)
BUN SERPL-MCNC: 11.9 MG/DL (ref 6–20)
CALCIUM SERPL-MCNC: 9.5 MG/DL (ref 8.6–10)
CHLORIDE SERPL-SCNC: 102 MMOL/L (ref 98–107)
CHOLEST SERPL-MCNC: 171 MG/DL
CREAT SERPL-MCNC: 0.76 MG/DL (ref 0.51–0.95)
DEPRECATED HCO3 PLAS-SCNC: 23 MMOL/L (ref 22–29)
ERYTHROCYTE [DISTWIDTH] IN BLOOD BY AUTOMATED COUNT: 13.5 % (ref 10–15)
GFR SERPL CREATININE-BSD FRML MDRD: >90 ML/MIN/1.73M2
GLUCOSE SERPL-MCNC: 94 MG/DL (ref 70–99)
HCT VFR BLD AUTO: 40.8 % (ref 35–47)
HDLC SERPL-MCNC: 68 MG/DL
HGB BLD-MCNC: 13.6 G/DL (ref 11.7–15.7)
LDLC SERPL CALC-MCNC: 88 MG/DL
MCH RBC QN AUTO: 30.6 PG (ref 26.5–33)
MCHC RBC AUTO-ENTMCNC: 33.3 G/DL (ref 31.5–36.5)
MCV RBC AUTO: 92 FL (ref 78–100)
NONHDLC SERPL-MCNC: 103 MG/DL
PLATELET # BLD AUTO: 326 10E3/UL (ref 150–450)
POTASSIUM SERPL-SCNC: 4.8 MMOL/L (ref 3.4–5.3)
RBC # BLD AUTO: 4.45 10E6/UL (ref 3.8–5.2)
SODIUM SERPL-SCNC: 136 MMOL/L (ref 136–145)
TRIGL SERPL-MCNC: 73 MG/DL
TSH SERPL DL<=0.005 MIU/L-ACNC: 0.95 UIU/ML (ref 0.3–4.2)
WBC # BLD AUTO: 5.7 10E3/UL (ref 4–11)

## 2023-06-09 PROCEDURE — 36415 COLL VENOUS BLD VENIPUNCTURE: CPT

## 2023-06-09 PROCEDURE — 84443 ASSAY THYROID STIM HORMONE: CPT

## 2023-06-09 PROCEDURE — 82306 VITAMIN D 25 HYDROXY: CPT

## 2023-06-09 PROCEDURE — 96127 BRIEF EMOTIONAL/BEHAV ASSMT: CPT

## 2023-06-09 PROCEDURE — 85027 COMPLETE CBC AUTOMATED: CPT

## 2023-06-09 PROCEDURE — 99214 OFFICE O/P EST MOD 30 MIN: CPT

## 2023-06-09 PROCEDURE — 80048 BASIC METABOLIC PNL TOTAL CA: CPT

## 2023-06-09 PROCEDURE — 80061 LIPID PANEL: CPT

## 2023-06-09 RX ORDER — CITALOPRAM HYDROBROMIDE 40 MG/1
40 TABLET ORAL DAILY
COMMUNITY

## 2023-06-09 ASSESSMENT — ANXIETY QUESTIONNAIRES: GAD7 TOTAL SCORE: 10

## 2023-06-09 ASSESSMENT — PATIENT HEALTH QUESTIONNAIRE - PHQ9
10. IF YOU CHECKED OFF ANY PROBLEMS, HOW DIFFICULT HAVE THESE PROBLEMS MADE IT FOR YOU TO DO YOUR WORK, TAKE CARE OF THINGS AT HOME, OR GET ALONG WITH OTHER PEOPLE: VERY DIFFICULT
SUM OF ALL RESPONSES TO PHQ QUESTIONS 1-9: 18

## 2023-06-09 NOTE — PROGRESS NOTES
"  Assessment & Plan     (F33.1) Moderate episode of recurrent major depressive disorder (H)  (primary encounter diagnosis)  Comment: Follows with Psychiatry and psychology. Currently on Celexa 40MG.   Recently had a significant depressive episode and panic attack.   Having marital concerns with --may decide to separate. Feels very safe with him.  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     Over past few months has gained 40lbs,   Notes difficulty sleeping. Denies significant fatigue.   She has a good relationship with her therapist.   Injured her knee -- fractured her patella in October. Still working with PT. Had to have ligament repair in March of 2023.   No family hx of thyroid disease.   Denies any intent or plan to commit suicide.    Plan: TSH with free T4 reflex, Vitamin D Deficiency            (Z13.0) Screening for iron deficiency anemia  Plan: CBC with platelets            (Z13.1) Screening for diabetes mellitus  Plan: Basic metabolic panel  (Ca, Cl, CO2, Creat,         Gluc, K, Na, BUN)            (Z13.220) Screening for hyperlipidemia  Plan: Lipid panel reflex to direct LDL Fasting                 BMI:   Estimated body mass index is 28.9 kg/m  as calculated from the following:    Height as of this encounter: 1.657 m (5' 5.25\").    Weight as of this encounter: 79.4 kg (175 lb).   Weight management plan: Discussed healthy diet and exercise guidelines    Depression Screening Follow Up        6/8/2023     5:22 PM   PHQ   PHQ-9 Total Score 18   Q9: Thoughts of better off dead/self-harm past 2 weeks Several days   F/U: Thoughts of suicide or self-harm Yes   F/U: Self harm-plan No   F/U: Self-harm action No   F/U: Safety concerns No             Follow Up      Follow Up Actions Taken  Crisis resource information provided in the After Visit Summary  Referred patient back to mental health provider    Discussed the following ways the patient can " remain in a safe environment:  be around others    SAMMIE Glass CNP  [facility] FARHAD Ford is a 32 year old, presenting for the following health issues:  Establish Care        6/9/2023    10:26 AM   Additional Questions   Roomed by Vesta     History of Present Illness       Mental Health Follow-up:  Patient presents to follow-up on Depression & Anxiety.Patient's depression since last visit has been:  Worse  The patient is having other symptoms associated with depression.  Patient's anxiety since last visit has been:  Worse  The patient is having other symptoms associated with anxiety.  Any significant life events: relationship concerns, financial concerns, housing concerns and health concerns  Patient is feeling anxious or having panic attacks.  Patient has concerns about alcohol or drug use.    Reason for visit:  Weight gain and severe depression/panic attacks. Wanted to get some lab values tested to ensure everything is within normal limits. I also see a psychiatrist who does my primary med management.  Symptom onset:  More than a month  Symptoms include:  40 pound weight gain in 8 months. Severe panic attacks.  Symptom intensity:  Severe  Symptom progression:  Worsening  Had these symptoms before:  Yes  Has tried/received treatment for these symptoms:  Yes  Previous treatment was successful:  Yes  Prior treatment description:  Started Celexa 20mg three years ago, increased dose per psychiatrist to 40mg one week ago.  What makes it worse:  Interpersonal conflict.  What makes it better:  Family and friend support.    She eats 2-3 servings of fruits and vegetables daily.She consumes 0 sweetened beverage(s) daily.She exercises with enough effort to increase her heart rate 20 to 29 minutes per day.  She exercises with enough effort to increase her heart rate 3 or less days per week. She is missing 1 dose(s) of medications per week.  She is not taking prescribed  "medications regularly due to remembering to take.    Today's PHQ-9         PHQ-9 Total Score: 18    PHQ-9 Q9 Thoughts of better off dead/self-harm past 2 weeks :   Several days  Thoughts of suicide or self harm: (P) Yes  Self-harm Plan:   (P) No  Self-harm Action:     (P) No  Safety concerns for self or others: (P) No    How difficult have these problems made it for you to do your work, take care of things at home, or get along with other people: Very difficult  Today's SANTOS-7 Score: 10             Objective    /70   Pulse 75   Temp 97.5  F (36.4  C) (Tympanic)   Resp 16   Ht 1.657 m (5' 5.25\")   Wt 79.4 kg (175 lb)   LMP 05/19/2023 (Exact Date)   SpO2 100%   BMI 28.90 kg/m    Body mass index is 28.9 kg/m .    Physical Exam  Constitutional:       General: She is not in acute distress.     Appearance: Normal appearance. She is not ill-appearing, toxic-appearing or diaphoretic.   HENT:      Head: Normocephalic and atraumatic.   Eyes:      Conjunctiva/sclera: Conjunctivae normal.   Cardiovascular:      Rate and Rhythm: Normal rate and regular rhythm.      Heart sounds: Normal heart sounds.   Pulmonary:      Effort: Pulmonary effort is normal.      Breath sounds: Normal breath sounds.   Skin:     General: Skin is warm and dry.   Neurological:      Mental Status: She is alert and oriented to person, place, and time.   Psychiatric:         Mood and Affect: Mood normal.         Behavior: Behavior normal.         Thought Content: Thought content normal.         Judgment: Judgment normal.                 "

## 2023-06-10 LAB — DEPRECATED CALCIDIOL+CALCIFEROL SERPL-MC: 25 UG/L (ref 20–75)

## 2024-06-25 ENCOUNTER — PATIENT OUTREACH (OUTPATIENT)
Dept: CARE COORDINATION | Facility: CLINIC | Age: 34
End: 2024-06-25
Payer: COMMERCIAL

## 2024-09-15 ENCOUNTER — HEALTH MAINTENANCE LETTER (OUTPATIENT)
Age: 34
End: 2024-09-15

## 2024-11-14 ASSESSMENT — PATIENT HEALTH QUESTIONNAIRE - PHQ9
SUM OF ALL RESPONSES TO PHQ QUESTIONS 1-9: 11
SUM OF ALL RESPONSES TO PHQ QUESTIONS 1-9: 11
10. IF YOU CHECKED OFF ANY PROBLEMS, HOW DIFFICULT HAVE THESE PROBLEMS MADE IT FOR YOU TO DO YOUR WORK, TAKE CARE OF THINGS AT HOME, OR GET ALONG WITH OTHER PEOPLE: SOMEWHAT DIFFICULT

## 2024-11-15 ENCOUNTER — OFFICE VISIT (OUTPATIENT)
Dept: INTERNAL MEDICINE | Facility: CLINIC | Age: 34
End: 2024-11-15
Payer: COMMERCIAL

## 2024-11-15 VITALS
RESPIRATION RATE: 16 BRPM | WEIGHT: 199 LBS | DIASTOLIC BLOOD PRESSURE: 80 MMHG | SYSTOLIC BLOOD PRESSURE: 110 MMHG | OXYGEN SATURATION: 98 % | BODY MASS INDEX: 33.15 KG/M2 | HEART RATE: 69 BPM | HEIGHT: 65 IN | TEMPERATURE: 97.4 F

## 2024-11-15 DIAGNOSIS — F33.1 MODERATE EPISODE OF RECURRENT MAJOR DEPRESSIVE DISORDER (H): ICD-10-CM

## 2024-11-15 DIAGNOSIS — F41.9 ANXIETY: ICD-10-CM

## 2024-11-15 DIAGNOSIS — Z01.818 PREOP GENERAL PHYSICAL EXAM: Primary | ICD-10-CM

## 2024-11-15 DIAGNOSIS — Z98.890 HISTORY OF RIGHT KNEE SURGERY: ICD-10-CM

## 2024-11-15 LAB
HCG UR QL: NEGATIVE
HGB BLD-MCNC: 13.3 G/DL (ref 11.7–15.7)

## 2024-11-15 RX ORDER — BUPROPION HYDROCHLORIDE 150 MG/1
TABLET ORAL
COMMUNITY
Start: 2024-07-29

## 2024-11-15 RX ORDER — ETONOGESTREL AND ETHINYL ESTRADIOL VAGINAL RING .015; .12 MG/D; MG/D
RING VAGINAL
COMMUNITY
Start: 2024-09-25

## 2024-11-15 NOTE — PROGRESS NOTES
Preoperative Evaluation  [facility]  [address] [address]  Phone: [phone]  Fax: [phone]  Primary Provider: SAMMIE Glass CNP  Pre-op Performing Provider: Gail Wallace NP  Nov 15, 2024             11/9/2024   Surgical Information   What procedure is being done? Hardware Removal Right Tibia    Facility or Hospital where procedure/surgery will be performed: [facility]    Who is doing the procedure / surgery? Clifford Osorio    Date of surgery / procedure: 11/22/2024    Time of surgery / procedure: 0700    Where do you plan to recover after surgery? at home with family        Patient-reported     Fax number for surgical facility: [facility]    Assessment & Plan     The proposed surgical procedure is considered INTERMEDIATE risk.    Preop general physical exam  History of right knee surgery  Sees Dr. Osorio and has a history of surgery to correct patellar instability where she had hardware placed in the right knee.  Since then she has been having pain to that knee and is unable to do certain activities.  Plan is for removal of the hardware now.  No contraindication to proceed with planned procedure.  Urine pregnancy test was negative today.  - Hemoglobin; Future  - HCG qualitative urine; Future  - Hemoglobin  - HCG qualitative urine    Moderate episode of recurrent major depressive disorder (H)  Anxiety  Under care of psychiatry and psychology.  Currently on Wellbutrin and Celexa.  Can continue with these medications prior to surgery without any modifications.            - No identified additional risk factors other than previously addressed    Preoperative Medication Instructions  Antiplatelet or Anticoagulation Medication Instructions   - Patient is on no antiplatelet or anticoagulation medications.    Additional Medication Instructions  Take all scheduled medications on the day of surgery EXCEPT for  modifications listed below:   - Herbal medications and vitamins: DO NOT TAKE 14 days prior to surgery.   - ibuprofen (Advil, Motrin): DO NOT TAKE 1 day before surgery.     Recommendation  Approval given to proceed with proposed procedure, without further diagnostic evaluation.    Subjective   Liliana is a 34 year old, presenting for the following:  Pre-Op Exam (Hardware removed right tibia on 11/22 at [facility] by Dr Osorio)          11/15/2024     9:49 AM   Additional Questions   Roomed by Nuzhat MUELLER     Via the [employer] Maintenance questionnaire, the patient has reported the following services have been completed -Cervical Cancer Screening: [facility] [place], [place] 2023-05-17, this information has been sent to the abstraction team.  HPI related to upcoming procedure: Liliana is a pleasant 34-year-old female here today for preoperative evaluation prior to removal of hardware from the right knee.  She had surgery on 3/1/2023 to place hardware to treat patellar instability.  Since then she has been having pain in the right knee and has been unable to even kneel on that knee without it causing irritation.  Plan is to remove the hardware.  No acute concerns today.        11/9/2024   Pre-Op Questionnaire   Have you ever had a heart attack or stroke? No    Have you ever had surgery on your heart or blood vessels, such as a stent placement, a coronary artery bypass, or surgery on an artery in your head, neck, heart, or legs? No    Do you have chest pain with activity? No    Do you have a history of heart failure? No    Do you currently have a cold, bronchitis or symptoms of other infection? No    Do you have a cough, shortness of breath, or wheezing? No    Do you or anyone in your family have previous history of blood clots? No    Do you or does anyone in your family have a serious bleeding problem such as prolonged bleeding following surgeries or cuts? No    Have you ever had problems with anemia or been  told to take iron pills? No    Have you had any abnormal blood loss such as black, tarry or bloody stools, or abnormal vaginal bleeding? No    Have you ever had a blood transfusion? No    Are you willing to have a blood transfusion if it is medically needed before, during, or after your surgery? Yes    Have you or any of your relatives ever had problems with anesthesia? No    Do you have sleep apnea, excessive snoring or daytime drowsiness? No    Do you have any artifical heart valves or other implanted medical devices like a pacemaker, defibrillator, or continuous glucose monitor? No    Do you have artificial joints? No    Are you allergic to latex? No        Patient-reported     Health Care Directive  Patient does not have a Health Care Directive: Discussed advance care planning with patient; however, patient declined at this time.    Preoperative Review of    reviewed - no record of controlled substances prescribed.          Patient Active Problem List    Diagnosis Date Noted    Moderate episode of recurrent major depressive disorder (H) 02/21/2023     Priority: Medium     Under care of psychiatrist and therapist.      Anxiety 02/21/2023     Priority: Medium     Under care of psychiatrist therapist      Visual disturbance 01/08/2019     Priority: Medium    Visual field scotoma, unspecified laterality 01/08/2019     Priority: Medium      Past Medical History:   Diagnosis Date    NO ACTIVE PROBLEMS (aka NONE)      Past Surgical History:   Procedure Laterality Date    WISDOM TOOTH EXTRACTION       Current Outpatient Medications   Medication Sig Dispense Refill    buPROPion (WELLBUTRIN XL) 150 MG 24 hr tablet       citalopram (CELEXA) 40 MG tablet Take 40 mg by mouth daily      etonogestrel-ethinyl estradiol (NUVARING) 0.12-0.015 MG/24HR vaginal ring INSERT 1 RING VAGINALLY AS DIRECTED AND REMOVE AFTER 3 WEEKS. WAIT 7 DAYS BEFORE INSERTING NEW RING.      Fexofenadine HCl (ALLEGRA PO) Take 180 mg by mouth       "fluticasone (FLONASE) 50 MCG/ACT nasal spray Spray 2 sprays into both nostrils daily         No Known Allergies     Social History     Tobacco Use    Smoking status: Never     Passive exposure: Never    Smokeless tobacco: Never   Substance Use Topics    Alcohol use: Yes       History   Drug Use No           Constitutional: No fever or unexplained weight loss.  No severe fatigue.    HEENT: Negative for ear pain, changes in hearing, frequent nosebleeds, nasal congestion, runny nose, sore throat, loose teeth, dentures or partials.    Eyes: Denies any changes in vision or eye pain. Wear glasses/     Respiratory: Negative for cough, wheezing or shortness of breath.    Cardiovascular: No palpitations, tachycardia, chest pain or lower extremity edema.    Gastrointestinal: Negative for nausea, vomiting, abdominal pain, uncontrolled heartburn or changes in bowel movements.    Genitourinary: Negative for any urinary symptoms or changes in urinary habits.    Integumentary: Negative for any rash or suspicious lesions.    Musculoskeletal: No difficulty with weakness, walking or joint pain.    Neurological: Negative for severe dizziness, headaches or numbness.    Psychiatric: Denies recreational drug use. Reports alcohol use 3-4 days a weeks.     Allergic/immunologic: Negative for any history of complications with anesthesia.  History of seasonal allergies.  No known exposure to HIV or hepatitis C.      Objective    /80 (BP Location: Right arm, Patient Position: Sitting)   Pulse 69   Temp 97.4  F (36.3  C)   Resp 16   Ht 1.657 m (5' 5.25\")   Wt 90.3 kg (199 lb)   LMP 11/01/2024 (Approximate)   SpO2 98%   BMI 32.86 kg/m     Estimated body mass index is 32.86 kg/m  as calculated from the following:    Height as of this encounter: 1.657 m (5' 5.25\").    Weight as of this encounter: 90.3 kg (199 lb).  Physical Exam  Constitutional: In no acute distress.  Clean appearance.  Eyes: PERRLA.  EOMI.  No conjunctival " redness.  Ears: Bilateral TMs are intact without any erythema or effusion.  Grossly normal hearing.  Nose: Nares patent bilaterally.  Normal mucosa.  Oropharynx: Normal mucosa.  Dentition and gingiva is appropriate.  Posterior oropharynx without any abnormalities.  Neck: Supple.  Trachea is midline.  No thyromegaly.  Neck is without tenderness or masses.  Cardiovascular: Regular rate and rhythm.  Normal peripheral perfusion.  No edema.  No varicosities.  Respiratory: Lungs are clear bilaterally.  Normal respiratory effort.  Skin: Skin is without significant rashes or lesions.  No suspicious moles.  Gastrointestinal: Soft and flat.  Normal bowel sounds.  Nontender throughout upon palpation.  No organomegaly or masses.  Negative for CVA tenderness.  Musculoskeletal: Extremities without any cyanosis or clubbing.  No joint swelling or deformities.  Normal range of motion of extremities.  Gait normal.  Able to mount exam table without difficulties.  Psychiatric: Appropriate affect and demeanor.  Memory intact.  Good insight and judgment.  Neurologic: Sensation and temperature of extremities appropriate.  No tremor or involuntary movement noted.      Hemoglobin   Date Value Ref Range Status   11/15/2024 13.3 11.7 - 15.7 g/dL Final   01/08/2019 13.0 11.7 - 15.7 g/dL Final   ]      Diagnostics  Labs pending at this time.  Results will be reviewed when available.   No EKG required, no history of coronary heart disease, significant arrhythmia, peripheral arterial disease or other structural heart disease.    Revised Cardiac Risk Index (RCRI)  The patient has the following serious cardiovascular risks for perioperative complications:   - No serious cardiac risks = 0 points     RCRI Interpretation: 0 points: Class I (very low risk - 0.4% complication rate)         Signed Electronically by: Gail Wallace NP  A copy of this evaluation report is provided to the requesting physician.        Answers submitted by the patient for  this visit:  Patient Health Questionnaire (Submitted on 11/14/2024)  If you checked off any problems, how difficult have these problems made it for you to do your work, take care of things at home, or get along with other people?: Somewhat difficult  PHQ9 TOTAL SCORE: 11